# Patient Record
Sex: FEMALE | Race: OTHER | NOT HISPANIC OR LATINO | Employment: OTHER | URBAN - METROPOLITAN AREA
[De-identification: names, ages, dates, MRNs, and addresses within clinical notes are randomized per-mention and may not be internally consistent; named-entity substitution may affect disease eponyms.]

---

## 2022-01-06 ENCOUNTER — NEW PATIENT (OUTPATIENT)
Dept: URBAN - METROPOLITAN AREA CLINIC 52 | Facility: CLINIC | Age: 77
End: 2022-01-06

## 2022-01-06 DIAGNOSIS — H43.811: ICD-10-CM

## 2022-01-06 DIAGNOSIS — H43.22: ICD-10-CM

## 2022-01-06 DIAGNOSIS — H25.13: ICD-10-CM

## 2022-01-06 PROCEDURE — 92134 CPTRZ OPH DX IMG PST SGM RTA: CPT

## 2022-01-06 PROCEDURE — 99204 OFFICE O/P NEW MOD 45 MIN: CPT

## 2022-01-06 PROCEDURE — 92015 DETERMINE REFRACTIVE STATE: CPT

## 2022-01-06 ASSESSMENT — VISUAL ACUITY
OD_SC: 20/30-1
OS_PH: 20/30
OS_GLARE: 20/70
OD_GLARE: 20/30
OU_CC: J3
OD_GLARE: 20/60
OU_SC: 20/30
OS_GLARE: 20/40
OS_SC: 20/50-1

## 2022-01-06 ASSESSMENT — TONOMETRY
OS_IOP_MMHG: 17
OD_IOP_MMHG: 17

## 2022-01-06 NOTE — PATIENT DISCUSSION
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 Clinically, these opacities are quite refractile, giving the appearance of stars (or asteroids) shining in the night emmy—except that ocular asteroids are often quite mobile. The cause of asteroid hyalosis is unknown, but it has been associated with diabetes mellitus, hypertension and hypercholesterolemia. While asteroid hyalosis does not usually severely affect vision, the floating opacities can be quite annoying, and may interfere significantly with visualization and testing of the retina.

## 2022-01-12 ENCOUNTER — DIAGNOSTICS ONLY (OUTPATIENT)
Dept: URBAN - METROPOLITAN AREA CLINIC 52 | Facility: CLINIC | Age: 77
End: 2022-01-12

## 2022-01-12 DIAGNOSIS — H25.13: ICD-10-CM

## 2022-01-12 PROCEDURE — 92136 OPHTHALMIC BIOMETRY: CPT

## 2022-01-12 PROCEDURE — 92025IOL CORNEAL TOPOGRAPHY PREMIUM IOL

## 2022-01-12 ASSESSMENT — KERATOMETRY
OS_K2POWER_DIOPTERS: 43.12
OD_AXISANGLE2_DEGREES: 35
OD_K1POWER_DIOPTERS: 43.87
OS_AXISANGLE_DEGREES: 165
OS_K1POWER_DIOPTERS: 43.50
OD_AXISANGLE_DEGREES: 125
OD_K2POWER_DIOPTERS: 43.62
OS_AXISANGLE2_DEGREES: 75

## 2022-02-10 ENCOUNTER — PRE-OP/H&P (OUTPATIENT)
Dept: URBAN - METROPOLITAN AREA CLINIC 52 | Facility: CLINIC | Age: 77
End: 2022-02-10

## 2022-02-10 DIAGNOSIS — H25.12: ICD-10-CM

## 2022-02-10 PROCEDURE — PREOP PRE OP VISIT

## 2022-02-10 ASSESSMENT — TONOMETRY
OD_IOP_MMHG: 17
OS_IOP_MMHG: 18

## 2022-02-10 ASSESSMENT — KERATOMETRY
OS_K1POWER_DIOPTERS: 43.50
OD_AXISANGLE2_DEGREES: 35
OS_AXISANGLE2_DEGREES: 75
OS_K2POWER_DIOPTERS: 43.12
OD_K1POWER_DIOPTERS: 43.87
OS_AXISANGLE_DEGREES: 165
OD_K2POWER_DIOPTERS: 43.62
OD_AXISANGLE_DEGREES: 125

## 2022-02-10 ASSESSMENT — VISUAL ACUITY
OD_SC: 20/50
OS_SC: 20/50-1

## 2022-02-10 NOTE — PATIENT DISCUSSION
CATARACT SURGERY PLANNER - STANDARD IOL/+FEMTO: Phacoemulsification with IOL: Eye: LEFT|DOS: 2/16/22|Model: [id number]|Power: 22. 5|Target: PLANO|Femto: YES|Arcs: 32 @ 160 ; 32 @ 340|Visc: DUET|Omidria: YES|10% Phenylephrine: NO|Epi-shugarcaine: YES|Phaco Setting: DENSE|BSS+: NO|Trypan Blue: NO|CTR: NO|Olive Tip: NO|Atropine: NO|Pupilloplasty: NO|Notes: NO.

## 2022-02-15 ASSESSMENT — KERATOMETRY
OS_K2POWER_DIOPTERS: 43.12
OD_AXISANGLE_DEGREES: 125
OD_K2POWER_DIOPTERS: 43.62
OS_K1POWER_DIOPTERS: 43.50
OD_AXISANGLE2_DEGREES: 35
OD_K1POWER_DIOPTERS: 43.87
OS_AXISANGLE2_DEGREES: 75
OS_AXISANGLE_DEGREES: 165

## 2022-02-16 ENCOUNTER — SURGERY/PROCEDURE (OUTPATIENT)
Dept: URBAN - METROPOLITAN AREA SURGERY 16 | Facility: SURGERY | Age: 77
End: 2022-02-16

## 2022-02-16 ENCOUNTER — POST-OP (OUTPATIENT)
Dept: URBAN - METROPOLITAN AREA CLINIC 52 | Facility: CLINIC | Age: 77
End: 2022-02-16

## 2022-02-16 DIAGNOSIS — H25.12: ICD-10-CM

## 2022-02-16 DIAGNOSIS — Z98.42: ICD-10-CM

## 2022-02-16 DIAGNOSIS — Z96.1: ICD-10-CM

## 2022-02-16 PROCEDURE — 99024 POSTOP FOLLOW-UP VISIT: CPT

## 2022-02-16 PROCEDURE — 66984 XCAPSL CTRC RMVL W/O ECP: CPT

## 2022-02-16 ASSESSMENT — KERATOMETRY
OS_K1POWER_DIOPTERS: 43.50
OD_AXISANGLE_DEGREES: 125
OS_AXISANGLE2_DEGREES: 75
OS_AXISANGLE_DEGREES: 165
OD_AXISANGLE2_DEGREES: 35
OD_K2POWER_DIOPTERS: 43.62
OD_K1POWER_DIOPTERS: 43.87
OS_K2POWER_DIOPTERS: 43.12

## 2022-02-16 ASSESSMENT — TONOMETRY: OS_IOP_MMHG: 5

## 2022-02-16 ASSESSMENT — VISUAL ACUITY: OS_SC: 20/50-2

## 2022-02-16 NOTE — PATIENT DISCUSSION
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 Clinically, these opacities are quite refractile, giving the appearance of stars (or asteroids) shining in the night emmy—except that ocular asteroids are often quite mobile. The cause of asteroid hyalosis is unknown, but it has been associated with diabetes mellitus, hypertension and hypercholesterolemia. While asteroid hyalosis does not usually severely affect vision, the floating opacities can be quite annoying, and may interfere significantly with visualization and testing of the retina.
LENS OPTION (CLASSIC): Discussed with patient in detail all available methods and lens options as well as their associated benefits, limitations and out-of-pocket costs. Patient chooses femtosecond laser-assisted cataract surgery with monofocal lens implant and understands that reading glasses will still be needed after surgery. The patient also understands that with any IOL there is no guarantee that they will not require glasses to see their best at any distance after surgery. The risks, benefits and alternatives to surgery were explained and all questions were answered.
Recommended observation.
Retinal tear and detachment warning symptoms reviewed and patient instructed to call immediately if increasing floaters, flashes, or decreasing peripheral vision.
no

## 2022-02-24 ENCOUNTER — POST OP/EVAL OF SECOND EYE (OUTPATIENT)
Dept: URBAN - METROPOLITAN AREA CLINIC 52 | Facility: CLINIC | Age: 77
End: 2022-02-24

## 2022-02-24 VITALS
RESPIRATION RATE: 16 BRPM | HEART RATE: 79 BPM | SYSTOLIC BLOOD PRESSURE: 163 MMHG | DIASTOLIC BLOOD PRESSURE: 76 MMHG | HEIGHT: 55 IN

## 2022-02-24 DIAGNOSIS — H25.11: ICD-10-CM

## 2022-02-24 PROCEDURE — PREOP PRE OP VISIT

## 2022-02-24 PROCEDURE — 92136 - 2N OPHTHALMIC BIOMETRY BY PARTIAL COHERENCE INTERFEROMETRY WITH INTRAOCULAR LENS POWER CALCULATION

## 2022-02-24 ASSESSMENT — VISUAL ACUITY
OS_SC: J4 @ 14IN
OS_SC: 20/30+1
OD_SC: 20/30

## 2022-02-24 ASSESSMENT — TONOMETRY
OS_IOP_MMHG: 14
OD_IOP_MMHG: 14

## 2022-02-24 NOTE — PATIENT DISCUSSION
CATARACT SURGERY PLANNER - STANDARD IOL/+FEMTO: Phacoemulsification with IOL: Eye: RIGHT|DOS: 3/2/22|Model: [id number]|Power: 21. 5|Target: PLANO|Femto: YES|Arcs: 15 @ 125 ; / @ /|Visc: DUET|Omidria: YES|10% Phenylephrine: NO|Epi-shugarcaine: YES|Phaco Setting: DENSE|BSS+: Bonilla Loss: NO|CTR: NO|Olive Tip: NO|Atropine: NO|Pupilloplasty: NO|Notes: ARMD.

## 2022-03-02 ENCOUNTER — SURGERY/PROCEDURE (OUTPATIENT)
Dept: URBAN - METROPOLITAN AREA SURGERY 16 | Facility: SURGERY | Age: 77
End: 2022-03-02

## 2022-03-02 ENCOUNTER — POST-OP (OUTPATIENT)
Dept: URBAN - METROPOLITAN AREA CLINIC 52 | Facility: CLINIC | Age: 77
End: 2022-03-02

## 2022-03-02 DIAGNOSIS — H25.11: ICD-10-CM

## 2022-03-02 DIAGNOSIS — Z98.41: ICD-10-CM

## 2022-03-02 DIAGNOSIS — Z96.1: ICD-10-CM

## 2022-03-02 PROCEDURE — 99024 POSTOP FOLLOW-UP VISIT: CPT

## 2022-03-02 PROCEDURE — 66984 XCAPSL CTRC RMVL W/O ECP: CPT

## 2022-03-02 ASSESSMENT — VISUAL ACUITY: OD_SC: 20/40

## 2022-03-02 ASSESSMENT — TONOMETRY: OD_IOP_MMHG: 22

## 2022-03-10 ENCOUNTER — POST-OP (OUTPATIENT)
Dept: URBAN - METROPOLITAN AREA CLINIC 52 | Facility: CLINIC | Age: 77
End: 2022-03-10

## 2022-03-10 DIAGNOSIS — Z96.1: ICD-10-CM

## 2022-03-10 DIAGNOSIS — Z98.41: ICD-10-CM

## 2022-03-10 PROCEDURE — 99024 POSTOP FOLLOW-UP VISIT: CPT

## 2022-03-10 ASSESSMENT — VISUAL ACUITY
OS_SC: 20/20-1
OD_SC: J2
OD_SC: 20/25-2
OD_SC: J2

## 2022-03-10 ASSESSMENT — TONOMETRY
OS_IOP_MMHG: 14
OD_IOP_MMHG: 15

## 2022-03-30 ENCOUNTER — POST-OP (OUTPATIENT)
Dept: URBAN - METROPOLITAN AREA CLINIC 52 | Facility: CLINIC | Age: 77
End: 2022-03-30

## 2022-03-30 DIAGNOSIS — Z96.1: ICD-10-CM

## 2022-03-30 DIAGNOSIS — Z98.41: ICD-10-CM

## 2022-03-30 DIAGNOSIS — Z98.42: ICD-10-CM

## 2022-03-30 PROCEDURE — 99024 POSTOP FOLLOW-UP VISIT: CPT

## 2022-03-30 PROCEDURE — 92015GRNC REFRACTION GLASSES RECHECK NO CHARGE

## 2022-03-30 RX ORDER — OLOPATADINE HYDROCHLORIDE 2 MG/ML: 1 SOLUTION OPHTHALMIC EVERY EVENING

## 2022-03-30 ASSESSMENT — VISUAL ACUITY
OD_SC: 20/25-2
OS_SC: 20/20
OD_SC: 20/30
OS_SC: 20/30

## 2022-03-30 ASSESSMENT — TONOMETRY
OD_IOP_MMHG: 14
OS_IOP_MMHG: 14

## 2022-03-30 NOTE — PATIENT DISCUSSION
She has swelling under her right eye. Discussed that the pollen count is very high right now. She can always try a Zyrtec at bedtime and Pataday Extra strength at bed time. Advised the use of cool compresses and increasing her water intake.

## 2022-04-25 ENCOUNTER — EMERGENCY VISIT (OUTPATIENT)
Dept: URBAN - METROPOLITAN AREA CLINIC 53 | Facility: CLINIC | Age: 77
End: 2022-04-25

## 2022-04-25 VITALS — DIASTOLIC BLOOD PRESSURE: 94 MMHG | SYSTOLIC BLOOD PRESSURE: 148 MMHG | HEIGHT: 55 IN | HEART RATE: 83 BPM

## 2022-04-25 DIAGNOSIS — Z96.1: ICD-10-CM

## 2022-04-25 PROCEDURE — PREOP PRE OP VISIT

## 2022-04-25 ASSESSMENT — TONOMETRY
OS_IOP_MMHG: 16
OD_IOP_MMHG: 16

## 2022-04-25 ASSESSMENT — VISUAL ACUITY
OD_SC: 20/25-2
OS_SC: 20/20

## 2022-04-25 NOTE — PATIENT DISCUSSION
May still have some inflammation following surgery. Will restart Pred-Moxi-Nepaf BID x 1 week then QD x 1 week.

## 2022-08-17 ENCOUNTER — COMPREHENSIVE EXAM (OUTPATIENT)
Dept: URBAN - METROPOLITAN AREA CLINIC 52 | Facility: CLINIC | Age: 77
End: 2022-08-17

## 2022-08-17 DIAGNOSIS — H43.811: ICD-10-CM

## 2022-08-17 DIAGNOSIS — H43.22: ICD-10-CM

## 2022-08-17 PROCEDURE — 92014 COMPRE OPH EXAM EST PT 1/>: CPT

## 2022-08-17 ASSESSMENT — VISUAL ACUITY
OU_CC: J1+
OD_SC: 20/25+2
OS_GLARE: 20/25
OS_SC: 20/20-1
OU_SC: -1
OD_GLARE: 20/25

## 2022-08-17 ASSESSMENT — TONOMETRY
OD_IOP_MMHG: 15
OS_IOP_MMHG: 15

## 2022-09-20 NOTE — PATIENT DISCUSSION
Retinal tear and detachment warning symptoms reviewed and patient instructed to call immediately if increasing floaters, flashes, or decreasing peripheral vision. [FreeTextEntry1] : No loose fragments